# Patient Record
Sex: MALE | HISPANIC OR LATINO | Employment: UNEMPLOYED | URBAN - METROPOLITAN AREA
[De-identification: names, ages, dates, MRNs, and addresses within clinical notes are randomized per-mention and may not be internally consistent; named-entity substitution may affect disease eponyms.]

---

## 2017-01-23 ENCOUNTER — RECORDS - HEALTHEAST (OUTPATIENT)
Dept: LAB | Facility: CLINIC | Age: 67
End: 2017-01-23

## 2017-01-24 LAB — HBA1C MFR BLD: 7.4 % (ref 4.2–6.1)

## 2022-08-29 ENCOUNTER — HOSPITAL ENCOUNTER (EMERGENCY)
Facility: CLINIC | Age: 72
Discharge: HOME | End: 2022-08-29
Attending: EMERGENCY MEDICINE | Admitting: EMERGENCY MEDICINE
Payer: COMMERCIAL

## 2022-08-29 ENCOUNTER — APPOINTMENT (OUTPATIENT)
Dept: GENERAL RADIOLOGY | Facility: CLINIC | Age: 72
End: 2022-08-29
Attending: EMERGENCY MEDICINE
Payer: COMMERCIAL

## 2022-08-29 VITALS
TEMPERATURE: 99.7 F | WEIGHT: 162.7 LBS | HEART RATE: 85 BPM | DIASTOLIC BLOOD PRESSURE: 66 MMHG | OXYGEN SATURATION: 98 % | RESPIRATION RATE: 18 BRPM | SYSTOLIC BLOOD PRESSURE: 150 MMHG

## 2022-08-29 DIAGNOSIS — M25.521 RIGHT ELBOW PAIN: ICD-10-CM

## 2022-08-29 PROCEDURE — 99283 EMERGENCY DEPT VISIT LOW MDM: CPT | Performed by: EMERGENCY MEDICINE

## 2022-08-29 PROCEDURE — 99282 EMERGENCY DEPT VISIT SF MDM: CPT | Performed by: EMERGENCY MEDICINE

## 2022-08-29 PROCEDURE — 73070 X-RAY EXAM OF ELBOW: CPT | Mod: RT

## 2022-08-29 ASSESSMENT — ENCOUNTER SYMPTOMS: ARTHRALGIAS: 1

## 2022-08-29 ASSESSMENT — ACTIVITIES OF DAILY LIVING (ADL): ADLS_ACUITY_SCORE: 36

## 2022-08-30 NOTE — ED PROVIDER NOTES
[facility] EMERGENCY DEPARTMENT ([facility])       8/29/22  History     Chief Complaint   Patient presents with     Elbow Injury     Patient reported he fell on tub while washing his right leg 2-3 days ago. Patient said he fell on his right side. Denies hitting his head. Patient c/o increased pain to right elbow.      The history is provided by the patient and medical records. A  was used (son).     Viral Grove is a 71 year old male who presents to the Emergency Department for evaluation of elbow injury.  Patient reports falling in the bathtub and hitting his right elbow a few days ago.  Since then he has had persistent pain.  He has taken 2 Tylenol with some improvement.  He is able to move his elbow.    Past Medical History  Past Medical History:   Diagnosis Date     Diabetes (H)      History reviewed. No pertinent surgical history.  No current outpatient medications on file.    No Known Allergies  Family History  History reviewed. No pertinent family history.  Social History   Social History     Tobacco Use     Smoking status: Never Smoker     Smokeless tobacco: Never Used      Past medical history, past surgical history, medications, allergies, family history, and social history were reviewed with the patient. No additional pertinent items.     I have reviewed the Medications, Allergies, Past Medical and Surgical History, and Social History in the Epic system.    Review of Systems   Musculoskeletal: Positive for arthralgias (right elbow).   All other systems reviewed and are negative.    A complete review of systems was performed with pertinent positives and negatives noted in the HPI, and all other systems negative.    Physical Exam   BP: (!) 150/66  Pulse: 85  Temp: 99.7  F (37.6  C)  Resp: 18  Weight: 73.8 kg (162 lb 11.2 oz)  SpO2: 98 %      Physical Exam  Vitals and nursing note reviewed.   Constitutional:       General: He is not in acute distress.     Appearance: He  is well-developed.   HENT:      Head: Normocephalic.      Right Ear: External ear normal.      Left Ear: External ear normal.      Nose: Nose normal.   Eyes:      Extraocular Movements: Extraocular movements intact.      Conjunctiva/sclera: Conjunctivae normal.   Pulmonary:      Effort: Pulmonary effort is normal. No respiratory distress.   Abdominal:      General: Abdomen is flat. There is no distension.   Musculoskeletal:         General: Tenderness present. No deformity. Normal range of motion.      Cervical back: Normal range of motion and neck supple.      Comments: Right elbow olecranon tenderness, no radial head tenderness.  Full range of motion present   Skin:     General: Skin is warm and dry.   Neurological:      Mental Status: He is alert. Mental status is at baseline.      Comments: Oriented   Psychiatric:         Mood and Affect: Mood normal.         Behavior: Behavior normal.         ED Course     At 9:01 PM the patient was seen and examined by Rito Cornell DO in Room ED20.        Procedures       Results for orders placed or performed during the hospital encounter of 08/29/22   XR Elbow Right 2 Views     Status: None    Narrative    EXAM: XR ELBOW RIGHT 2 VIEWS  LOCATION: [facility]  DATE/TIME: 8/29/2022 8:48 PM    INDICATION: Fall with right elbow pain.  COMPARISON: None.      Impression    IMPRESSION: No fracture or dislocation. Minimal degenerative changes right radiocapitellar articulation. Marked degenerative changes right ulnohumeral articulation. Moderate right elbow effusion. Atherosclerotic vascular calcification.              Results for orders placed or performed during the hospital encounter of 08/29/22 (from the past 24 hour(s))   XR Elbow Right 2 Views    Narrative    EXAM: XR ELBOW RIGHT 2 VIEWS  LOCATION: [facility]  DATE/TIME: 8/29/2022 8:48 PM    INDICATION: Fall with right elbow  pain.  COMPARISON: None.      Impression    IMPRESSION: No fracture or dislocation. Minimal degenerative changes right radiocapitellar articulation. Marked degenerative changes right ulnohumeral articulation. Moderate right elbow effusion. Atherosclerotic vascular calcification.     Medications - No data to display          Assessments & Plan (with Medical Decision Making)   31-year-old male presents to us with a chief complaint of elbow pain after a fall.  He denies other injury.  Vital signs today were unremarkable.  Previous records were reviewed.  Images were interpreted and show no evidence of acute fracture.  Recommend over-the-counter pain relievers and follow-up with primary care.    I have reviewed the nursing notes.    I have reviewed the findings, diagnosis, plan and need for follow up with the patient.    There are no discharge medications for this patient.      Final diagnoses:   Right elbow pain       IClark am serving as a trained medical scribe to document services personally performed by Rito Cornell DO, based on the provider's statements to me.      IRito DO, was physically present and have reviewed and verified the accuracy of this note documented by Clark Quiroga.     Rito Cornell DO  8/29/2022   [facility] EMERGENCY DEPARTMENT     Rito Cornell DO  08/30/22 2408

## 2022-08-30 NOTE — ED TRIAGE NOTES
Triage Assessment     Row Name 08/29/22 2017       Triage Assessment (Adult)    Airway WDL WDL       Respiratory WDL    Respiratory WDL WDL       Skin Circulation/Temperature WDL    Skin Circulation/Temperature WDL WDL       Cardiac WDL    Cardiac WDL WDL       Peripheral/Neurovascular WDL    Peripheral Neurovascular WDL WDL  pain to right elbow but sensation is normal       Cognitive/Neuro/Behavioral WDL    Cognitive/Neuro/Behavioral WDL WDL

## 2022-08-30 NOTE — DISCHARGE INSTRUCTIONS
There is no fracture in your elbow.  I recommend over-the-counter ibuprofen as needed for pain.  Follow-up with your primary care doctor and return to us as needed

## 2022-09-11 ENCOUNTER — HOSPITAL ENCOUNTER (EMERGENCY)
Facility: CLINIC | Age: 72
Discharge: HOME | End: 2022-09-11
Attending: FAMILY MEDICINE | Admitting: FAMILY MEDICINE
Payer: COMMERCIAL

## 2022-09-11 ENCOUNTER — APPOINTMENT (OUTPATIENT)
Dept: CT IMAGING | Facility: CLINIC | Age: 72
End: 2022-09-11
Attending: FAMILY MEDICINE
Payer: COMMERCIAL

## 2022-09-11 VITALS
HEART RATE: 66 BPM | SYSTOLIC BLOOD PRESSURE: 142 MMHG | TEMPERATURE: 97.8 F | DIASTOLIC BLOOD PRESSURE: 69 MMHG | RESPIRATION RATE: 16 BRPM | OXYGEN SATURATION: 98 %

## 2022-09-11 DIAGNOSIS — M25.78 DEGENERATION OF INTERVERTEBRAL DISC OF LUMBAR REGION WITH OSTEOPHYTE OF LUMBAR VERTEBRA: ICD-10-CM

## 2022-09-11 DIAGNOSIS — Z91.81 PERSONAL HISTORY OF FALL: ICD-10-CM

## 2022-09-11 DIAGNOSIS — M51.369 DEGENERATION OF INTERVERTEBRAL DISC OF LUMBAR REGION WITH OSTEOPHYTE OF LUMBAR VERTEBRA: ICD-10-CM

## 2022-09-11 DIAGNOSIS — S32.018D OTHER CLOSED FRACTURE OF FIRST LUMBAR VERTEBRA WITH ROUTINE HEALING, SUBSEQUENT ENCOUNTER: ICD-10-CM

## 2022-09-11 PROCEDURE — 99285 EMERGENCY DEPT VISIT HI MDM: CPT | Performed by: FAMILY MEDICINE

## 2022-09-11 PROCEDURE — 72192 CT PELVIS W/O DYE: CPT

## 2022-09-11 PROCEDURE — 72131 CT LUMBAR SPINE W/O DYE: CPT

## 2022-09-11 PROCEDURE — 99285 EMERGENCY DEPT VISIT HI MDM: CPT | Mod: 25 | Performed by: FAMILY MEDICINE

## 2022-09-11 PROCEDURE — 73700 CT LOWER EXTREMITY W/O DYE: CPT | Mod: RT

## 2022-09-11 PROCEDURE — 250N000013 HC RX MED GY IP 250 OP 250 PS 637: Performed by: FAMILY MEDICINE

## 2022-09-11 RX ORDER — TRAMADOL HYDROCHLORIDE 50 MG/1
50 TABLET ORAL ONCE
Status: COMPLETED | OUTPATIENT
Start: 2022-09-11 | End: 2022-09-11

## 2022-09-11 RX ORDER — LEVOTHYROXINE SODIUM 150 UG/1
TABLET ORAL
COMMUNITY
Start: 2021-12-07

## 2022-09-11 RX ORDER — IBUPROFEN 200 MG
200 TABLET ORAL EVERY 4 HOURS PRN
COMMUNITY
End: 2023-08-04

## 2022-09-11 RX ORDER — HYDROCODONE BITARTRATE AND ACETAMINOPHEN 5; 325 MG/1; MG/1
1 TABLET ORAL EVERY 6 HOURS PRN
Qty: 12 TABLET | Refills: 0 | Status: SHIPPED | OUTPATIENT
Start: 2022-09-11 | End: 2022-09-14

## 2022-09-11 RX ORDER — IBUPROFEN 200 MG
400 TABLET ORAL ONCE
Status: COMPLETED | OUTPATIENT
Start: 2022-09-11 | End: 2022-09-11

## 2022-09-11 RX ORDER — LIDOCAINE 4 G/G
4 PATCH TOPICAL ONCE
Status: DISCONTINUED | OUTPATIENT
Start: 2022-09-11 | End: 2022-09-11 | Stop reason: HOSPADM

## 2022-09-11 RX ORDER — METFORMIN HCL 500 MG
1000 TABLET, EXTENDED RELEASE 24 HR ORAL
COMMUNITY
Start: 2021-12-07

## 2022-09-11 RX ORDER — SITAGLIPTIN 100 MG/1
TABLET, FILM COATED ORAL
COMMUNITY
Start: 2022-08-09

## 2022-09-11 RX ORDER — LISINOPRIL 10 MG/1
10 TABLET ORAL DAILY
COMMUNITY
Start: 2021-12-07

## 2022-09-11 RX ORDER — CARVEDILOL 6.25 MG/1
TABLET ORAL
COMMUNITY
Start: 2022-08-09

## 2022-09-11 RX ORDER — HYDROCODONE BITARTRATE AND ACETAMINOPHEN 5; 325 MG/1; MG/1
1 TABLET ORAL ONCE
Status: COMPLETED | OUTPATIENT
Start: 2022-09-11 | End: 2022-09-11

## 2022-09-11 RX ORDER — DOCUSATE SODIUM 100 MG/1
100 CAPSULE, LIQUID FILLED ORAL 2 TIMES DAILY
Qty: 30 CAPSULE | Refills: 12 | Status: SHIPPED | OUTPATIENT
Start: 2022-09-11

## 2022-09-11 RX ORDER — ATORVASTATIN CALCIUM 80 MG/1
TABLET, FILM COATED ORAL
COMMUNITY
Start: 2021-12-07

## 2022-09-11 RX ADMIN — IBUPROFEN 400 MG: 200 TABLET, FILM COATED ORAL at 14:35

## 2022-09-11 RX ADMIN — TRAMADOL HYDROCHLORIDE 50 MG: 50 TABLET, COATED ORAL at 12:00

## 2022-09-11 RX ADMIN — HYDROCODONE BITARTRATE AND ACETAMINOPHEN 1 TABLET: 5; 325 TABLET ORAL at 14:35

## 2022-09-11 RX ADMIN — LIDOCAINE PATCH 4% 1 PATCH: 40 PATCH TOPICAL at 14:35

## 2022-09-11 ASSESSMENT — ACTIVITIES OF DAILY LIVING (ADL)
ADLS_ACUITY_SCORE: 36
ADLS_ACUITY_SCORE: 36
ADLS_ACUITY_SCORE: 35

## 2022-09-11 ASSESSMENT — ENCOUNTER SYMPTOMS
DYSPHORIC MOOD: 1
NECK STIFFNESS: 0
COUGH: 0
ACTIVITY CHANGE: 1
BRUISES/BLEEDS EASILY: 0
APPETITE CHANGE: 0
FEVER: 0
BLOOD IN STOOL: 0
JOINT SWELLING: 0
WEAKNESS: 0
EYE REDNESS: 0
SHORTNESS OF BREATH: 0
FACIAL SWELLING: 0
ARTHRALGIAS: 1
ABDOMINAL PAIN: 0
TROUBLE SWALLOWING: 0
FLANK PAIN: 0
COLOR CHANGE: 0
RHINORRHEA: 0
DIFFICULTY URINATING: 0
FREQUENCY: 0
NECK PAIN: 0
WOUND: 0
CONFUSION: 0
HEADACHES: 0
NAUSEA: 0
VOMITING: 0
DECREASED CONCENTRATION: 1
MYALGIAS: 0
BACK PAIN: 1

## 2022-09-11 NOTE — DISCHARGE INSTRUCTIONS
Home.  Your CT shows a small fracture of the first Lumbar Spine vertebra.  Discussed with trauma and feel pain control is the main treatment.  Take ibuprofen 400mg every 6 hours.  Take tylenol for pain also.  Take the Norco for breakthrough pain.  Ice and heat to back.  Leave the lidoderm patch on for 12 hours, then remove for 12 hours.  Then reapply in the same manner for pain control with the next 3 patches over 3 more days.  See MD for follow up this week.  Return if worse symptoms or bowel or bladder concerns or extreme weakness noted.  Use the cane for ambulation as needed.      Please make an appointment to follow up with Your Primary Care Provider, [facility] (phone: [phone]), Primary Care - [facility] (phone: [phone]), and Primary Care - [facility] (phone: [phone]) as soon as possible. For a recheck this week.    Results for orders placed or performed during the hospital encounter of 09/11/22   Lumbar spine CT w/o contrast     Status: None    Narrative    EXAM: CT LUMBAR SPINE W/O CONTRAST  LOCATION: [facility]  DATE/TIME: 9/11/2022 1:41 PM    INDICATION: trauma eval with low back pain  COMPARISON: None.  TECHNIQUE: Routine CT Lumbar Spine without IV contrast. Multiplanar reformats. Dose reduction techniques were used.     FINDINGS:  VERTEBRA: Normal vertebral body heights. There is discontinuity involving the right lateral inferior endplate osteophyte of L1 (image 28, series 5).     CANAL/FORAMINA: Severe spinal canal stenosis at L3-L4.    PARASPINAL: No extraspinal abnormality.      Impression    IMPRESSION:  1.  There is an age-indeterminate (but favored to be acute) fracture involving the right lateral inferior endplate osteophyte of L1.    2.  Severe spinal canal stenosis at L3-L4.   CT Pelvis Bone wo Contrast     Status: None    Narrative    EXAM: CT PELVIS BONE WITHOUT CONTRAST, CT HIP RIGHT WITHOUT  CONTRAST  LOCATION: [facility]  DATE/TIME: 09/11/2022, 1:42 PM    INDICATION: Trauma and pain.  COMPARISON: None available.  TECHNIQUE: CT scan of the pelvis was performed without IV contrast. Multiplanar reformats were obtained. Dose reduction techniques were used.  CONTRAST: None.    FINDINGS: There is no CT evidence of an acute right hip or pelvic fracture. Mild degenerative changes of the right hip. No dislocation. Postoperative changes prior total left hip replacement. Bone formation along the periacetabular region and about the   hip is chronic.    Advanced arthritic changes in the visualized lumbar spine, including advanced spinal canal stenosis at L3-L4 Arthritic changes SI joints with partial bony fusion anteriorly, right greater than left.    Bladder is distended. Atherosclerotic vascular calcifications. No soft tissue hematoma about the right hip.      Impression     IMPRESSION:  1.  No CT evidence of an acute right hip or pelvic fracture.       CT Hip Right w/o Contrast     Status: None    Narrative    EXAM: CT PELVIS BONE WITHOUT CONTRAST, CT HIP RIGHT WITHOUT CONTRAST  LOCATION: [facility]  DATE/TIME: 09/11/2022, 1:42 PM    INDICATION: Trauma and pain.  COMPARISON: None available.  TECHNIQUE: CT scan of the pelvis was performed without IV contrast. Multiplanar reformats were obtained. Dose reduction techniques were used.  CONTRAST: None.    FINDINGS: There is no CT evidence of an acute right hip or pelvic fracture. Mild degenerative changes of the right hip. No dislocation. Postoperative changes prior total left hip replacement. Bone formation along the periacetabular region and about the   hip is chronic.    Advanced arthritic changes in the visualized lumbar spine, including advanced spinal canal stenosis at L3-L4 Arthritic changes SI joints with partial bony fusion anteriorly, right greater than  left.    Bladder is distended. Atherosclerotic vascular calcifications. No soft tissue hematoma about the right hip.      Impression     IMPRESSION:  1.  No CT evidence of an acute right hip or pelvic fracture.

## 2022-09-11 NOTE — ED PROVIDER NOTES
[facility] DEPARTMENT ([facility])     September 11, 2022      History     Chief Complaint   Patient presents with     Back Pain     HPI  Viral Groev is a 72 year old male with a past medical history significant for CAD, type 2 diabetes mellitus, who presents to the Emergency Department for evaluation of right back pain and hip. An Ipad  was used. Patient reports falling in the bathtub on 8/25. Patient states they went to [facility] after the fall and was seen for arm pain and back pain. Patient states a xray was done and was prescribed ibuprofen and tylenol for pain but no improvement. Patient states arm pain has improved but the back pain has not improved. Patient reports that pain is mostly on the right side of the body, on right hip and right lower back. Patient states that the pain has been constant and it's affecting his walk and posture. Patients states he took ibuprofen 200 mg this morning.  Patient denies any leg weakness or numbness, difficulty urinating, changes in Bowel movement and vision.      Past Medical History  Past Medical History:   Diagnosis Date     Diabetes (H)      History reviewed. No pertinent surgical history.  atorvastatin (LIPITOR) 80 MG tablet  carvedilol (COREG) 6.25 MG tablet  docusate sodium (COLACE) 100 MG capsule  HYDROcodone-acetaminophen (NORCO) 5-325 MG tablet  ibuprofen (ADVIL/MOTRIN) 200 MG tablet  JANUVIA 100 MG tablet  levothyroxine (SYNTHROID/LEVOTHROID) 150 MCG tablet  lisinopril (ZESTRIL) 10 MG tablet  metFORMIN (GLUCOPHAGE XR) 500 MG 24 hr tablet      No Known Allergies  Family History  No family history on file.  Social History   Social History     Tobacco Use     Smoking status: Never Smoker     Smokeless tobacco: Never Used      Past medical history, past surgical history, medications, allergies, family history, and social history were reviewed with the patient. No additional pertinent items.       Review of Systems   Constitutional:  Positive for activity change. Negative for appetite change and fever.   HENT: Negative for congestion, facial swelling, mouth sores, rhinorrhea and trouble swallowing.    Eyes: Negative for redness and visual disturbance.   Respiratory: Negative for cough and shortness of breath.    Cardiovascular: Negative for chest pain.   Gastrointestinal: Negative for abdominal pain, blood in stool, nausea and vomiting.   Genitourinary: Negative for difficulty urinating, flank pain and frequency.        No bowel or bladder   Musculoskeletal: Positive for arthralgias, back pain and gait problem. Negative for joint swelling, myalgias, neck pain and neck stiffness.   Skin: Negative for color change, rash and wound.   Allergic/Immunologic: Negative for immunocompromised state.   Neurological: Negative for weakness and headaches.   Hematological: Does not bruise/bleed easily.   Psychiatric/Behavioral: Positive for decreased concentration and dysphoric mood. Negative for confusion.   All other systems reviewed and are negative.    A complete review of systems was performed with pertinent positives and negatives noted in the HPI, and all other systems negative.    Physical Exam   BP: (!) 160/62  Pulse: 74  Temp: 97.8  F (36.6  C)  Resp: 16  SpO2: 99 %  Physical Exam  Vitals and nursing note reviewed.   Constitutional:       General: He is in acute distress.      Appearance: Normal appearance. He is well-developed. He is not diaphoretic.      Comments: Patient uncomfortable here  services used daughter present also   HENT:      Head: Normocephalic and atraumatic.      Nose: Nose normal.      Mouth/Throat:      Mouth: Mucous membranes are moist.   Eyes:      General: No scleral icterus.     Extraocular Movements: Extraocular movements intact.      Conjunctiva/sclera: Conjunctivae normal.      Pupils: Pupils are equal, round, and reactive to light.   Cardiovascular:      Rate and Rhythm: Normal rate and regular rhythm.    Pulmonary:      Effort: No respiratory distress.      Breath sounds: No stridor.   Abdominal:      General: There is no distension.      Palpations: Abdomen is soft. There is no mass.      Tenderness: There is no abdominal tenderness.   Musculoskeletal:         General: Tenderness present. No swelling or deformity.      Cervical back: Normal range of motion and neck supple. No rigidity or tenderness.      Comments: Tenderness noted at the right SI joint and paralumbar region.   Skin:     General: Skin is warm and dry.      Capillary Refill: Capillary refill takes less than 2 seconds.      Findings: No rash.   Neurological:      General: No focal deficit present.      Mental Status: He is alert and oriented to person, place, and time. Mental status is at baseline.   Psychiatric:      Comments: Uncomfortable because of pain otherwise appropriate         ED Course      Procedures        11:02 AM  The patient was seen and examined by Bari Verdugo MD in Room ED07.     The ER reviewing patient's previous hospitalization evaluations in the ER both here and at [facility] only x-rays were done.    We did order CT scan of the lumbar spine along with pelvis and right hip.    Patient has received 50 mg of Ultram without any relief.  Patient then received a Lidoderm patch along with 400 mg of ibuprofen and 1 Norco.    Patient had improvement of his symptoms feeling much better.    Patient CT scan of the lumbar spine showed L1 right inferior endplate osteophyte fracture    Discussed with trauma at this point patient feeling better there is no operative procedure feel at this point no indication for bracing his pain control patient feels much better feels he does not want a stay in the hospital he is comfortable going home.  Discussed with his daughter also they agree patient does seem much improved.  Was sent home with 3 more Lidoderm patches on 12 hours off 12 hours also a prescription for Norco continue ibuprofen 400 mg  Tylenol ice heat rest follow-up with MD for recheck and return if bowel bladder problems weakness etc.    At this point patient feels much better and is comfortable going home we also sent him home with a pistol- cane for support with ambulation.                  Results for orders placed or performed during the hospital encounter of 09/11/22   Lumbar spine CT w/o contrast     Status: None    Narrative    EXAM: CT LUMBAR SPINE W/O CONTRAST  LOCATION: [facility]  DATE/TIME: 9/11/2022 1:41 PM    INDICATION: trauma eval with low back pain  COMPARISON: None.  TECHNIQUE: Routine CT Lumbar Spine without IV contrast. Multiplanar reformats. Dose reduction techniques were used.     FINDINGS:  VERTEBRA: Normal vertebral body heights. There is discontinuity involving the right lateral inferior endplate osteophyte of L1 (image 28, series 5).     CANAL/FORAMINA: Severe spinal canal stenosis at L3-L4.    PARASPINAL: No extraspinal abnormality.      Impression    IMPRESSION:  1.  There is an age-indeterminate (but favored to be acute) fracture involving the right lateral inferior endplate osteophyte of L1.    2.  Severe spinal canal stenosis at L3-L4.   CT Pelvis Bone wo Contrast     Status: None    Narrative    EXAM: CT PELVIS BONE WITHOUT CONTRAST, CT HIP RIGHT WITHOUT CONTRAST  LOCATION: [facility]  DATE/TIME: 09/11/2022, 1:42 PM    INDICATION: Trauma and pain.  COMPARISON: None available.  TECHNIQUE: CT scan of the pelvis was performed without IV contrast. Multiplanar reformats were obtained. Dose reduction techniques were used.  CONTRAST: None.    FINDINGS: There is no CT evidence of an acute right hip or pelvic fracture. Mild degenerative changes of the right hip. No dislocation. Postoperative changes prior total left hip replacement. Bone formation along the periacetabular region and about the   hip is chronic.    Advanced  arthritic changes in the visualized lumbar spine, including advanced spinal canal stenosis at L3-L4 Arthritic changes SI joints with partial bony fusion anteriorly, right greater than left.    Bladder is distended. Atherosclerotic vascular calcifications. No soft tissue hematoma about the right hip.      Impression     IMPRESSION:  1.  No CT evidence of an acute right hip or pelvic fracture.       CT Hip Right w/o Contrast     Status: None    Narrative    EXAM: CT PELVIS BONE WITHOUT CONTRAST, CT HIP RIGHT WITHOUT CONTRAST  LOCATION: [facility]  DATE/TIME: 09/11/2022, 1:42 PM    INDICATION: Trauma and pain.  COMPARISON: None available.  TECHNIQUE: CT scan of the pelvis was performed without IV contrast. Multiplanar reformats were obtained. Dose reduction techniques were used.  CONTRAST: None.    FINDINGS: There is no CT evidence of an acute right hip or pelvic fracture. Mild degenerative changes of the right hip. No dislocation. Postoperative changes prior total left hip replacement. Bone formation along the periacetabular region and about the   hip is chronic.    Advanced arthritic changes in the visualized lumbar spine, including advanced spinal canal stenosis at L3-L4 Arthritic changes SI joints with partial bony fusion anteriorly, right greater than left.    Bladder is distended. Atherosclerotic vascular calcifications. No soft tissue hematoma about the right hip.      Impression     IMPRESSION:  1.  No CT evidence of an acute right hip or pelvic fracture.         Medications   traMADol (ULTRAM) tablet 50 mg (50 mg Oral Given 9/11/22 1200)   HYDROcodone-acetaminophen (NORCO) 5-325 MG per tablet 1 tablet (1 tablet Oral Given 9/11/22 1435)   ibuprofen (ADVIL/MOTRIN) tablet 400 mg (400 mg Oral Given 9/11/22 1435)        Assessments & Plan (with Medical Decision Making)  72-year-old male who fell a couple weeks ago in the bathtub initial injury elbow also low back had  plain x-rays done 2 ER visits treat with Tylenol ibuprofen having increasing pain but no bowel or bladder problems or weakness.  Tenderness in the right SI region along with paralumbar area.  Distally neurologically intact otherwise but lumbar range of motion primarily because of pain in the back area.  Pulses are good otherwise.  CT scan was done of the lumbar pelvic and right hip area pelvis and right hip were negative patient has an osteophyte fracture of the right inferior endplate of L1.  Discussed with trauma at this point no bracing or intervention recommendations reviewed for pain control patient improved initially received Ultram did not help with then given ibuprofen and 1 Norco and Lidoderm patch patient feeling much better is comfortable going home offered admission but at this point he feels he can manage at home daughter agrees patient sent out with a cane for support along with this we will be recommended to use ibuprofen and Tylenol Norco if needed for breakthrough pain ice heat rest and follow-up with MD return if any concerns at all.  Patient given 3 more days of Lidoderm patches on 12 hours off 12 hours.  Discussed with daughter regarding this agrees and understands.  Patient return of bowel bladder problems or any weakness otherwise.  Daughter agrees.       I have reviewed the nursing notes. I have reviewed the findings, diagnosis, plan and need for follow up with the patient.    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      Bari BECKFORD MD, was physically present and have reviewed and verified the accuracy of this note documented by Tiffany Kaur.     --  Bari Verdugo MD  [facility] EMERGENCY DEPARTMENT  9/11/2022    This note was created at least in part by the use of dragon voice dictation system. Inadvertent typographical errors may still exist.  Bari Verdugo MD.    Patient evaluated in the emergency department during the COVID-19 pandemic period. Careful attention to patients safety was addressed throughout the evaluation. Evaluation and treatment management was initiated with disposition made efficiently and appropriate as possible to minimize any risk of potential exposure to patient during this evaluation.       Bari Verdugo MD  09/11/22 [id number]

## 2022-09-11 NOTE — ED NOTES
Pt to discharge home. Cane provided. Discussed home rx and AVS. Answered all questions at this time.

## 2022-09-11 NOTE — ED TRIAGE NOTES
Pt reports fall in August in the tub and was seen for arm pain and back pain. The arm pain has improved but the back pain has not. It does get better with ibuprofen. Pt took ibuprofen this morning. Pt reports no pain down the leg and no urination symptoms. Pt does have metal in left leg.      Triage Assessment     Row Name 09/11/22 1039       Triage Assessment (Adult)    Airway WDL WDL       Respiratory WDL    Respiratory WDL WDL       Skin Circulation/Temperature WDL    Skin Circulation/Temperature WDL WDL       Cardiac WDL    Cardiac WDL WDL       Peripheral/Neurovascular WDL    Peripheral Neurovascular WDL WDL       Cognitive/Neuro/Behavioral WDL    Cognitive/Neuro/Behavioral WDL WDL

## 2023-08-03 ENCOUNTER — APPOINTMENT (OUTPATIENT)
Dept: GENERAL RADIOLOGY | Facility: CLINIC | Age: 73
End: 2023-08-03
Attending: EMERGENCY MEDICINE
Payer: COMMERCIAL

## 2023-08-03 ENCOUNTER — HOSPITAL ENCOUNTER (EMERGENCY)
Facility: CLINIC | Age: 73
Discharge: HOME | End: 2023-08-04
Attending: EMERGENCY MEDICINE | Admitting: EMERGENCY MEDICINE
Payer: COMMERCIAL

## 2023-08-03 ENCOUNTER — APPOINTMENT (OUTPATIENT)
Dept: CT IMAGING | Facility: CLINIC | Age: 73
End: 2023-08-03
Attending: EMERGENCY MEDICINE
Payer: COMMERCIAL

## 2023-08-03 DIAGNOSIS — M54.2 ACUTE NECK PAIN: ICD-10-CM

## 2023-08-03 DIAGNOSIS — M47.819 SPINAL ARTHRITIS DEFORMANS: ICD-10-CM

## 2023-08-03 DIAGNOSIS — M25.552 CHRONIC LEFT HIP PAIN: ICD-10-CM

## 2023-08-03 DIAGNOSIS — M48.061 SPINAL STENOSIS, LUMBAR REGION, WITHOUT NEUROGENIC CLAUDICATION: Primary | ICD-10-CM

## 2023-08-03 DIAGNOSIS — M43.6 TORTICOLLIS: ICD-10-CM

## 2023-08-03 DIAGNOSIS — M51.369 DEGENERATION OF LUMBAR INTERVERTEBRAL DISC: ICD-10-CM

## 2023-08-03 DIAGNOSIS — G89.29 CHRONIC LEFT HIP PAIN: ICD-10-CM

## 2023-08-03 PROBLEM — L30.8 SPONGIOTIC DERMATITIS: Status: ACTIVE | Noted: 2023-06-06

## 2023-08-03 PROBLEM — S72.002D: Status: ACTIVE | Noted: 2017-01-02

## 2023-08-03 PROBLEM — E55.9 VITAMIN D DEFICIENCY: Status: ACTIVE | Noted: 2017-01-05

## 2023-08-03 PROBLEM — E11.3293 NON-PROLIFERATIVE DIABETIC RETINOPATHY, BOTH EYES (H): Status: ACTIVE | Noted: 2022-09-07

## 2023-08-03 PROBLEM — Z96.642 PRESENCE OF LEFT ARTIFICIAL HIP JOINT: Status: ACTIVE | Noted: 2017-01-06

## 2023-08-03 PROBLEM — R07.9 RIGHT-SIDED CHEST PAIN: Status: ACTIVE | Noted: 2023-08-03

## 2023-08-03 PROBLEM — I48.0 PAROXYSMAL ATRIAL FIBRILLATION (H): Status: ACTIVE | Noted: 2017-01-06

## 2023-08-03 PROBLEM — Z96.642 HISTORY OF TOTAL LEFT HIP ARTHROPLASTY: Status: ACTIVE | Noted: 2017-02-15

## 2023-08-03 PROCEDURE — 99284 EMERGENCY DEPT VISIT MOD MDM: CPT | Performed by: EMERGENCY MEDICINE

## 2023-08-03 PROCEDURE — 72125 CT NECK SPINE W/O DYE: CPT

## 2023-08-03 PROCEDURE — 99284 EMERGENCY DEPT VISIT MOD MDM: CPT | Mod: 25

## 2023-08-03 PROCEDURE — 250N000013 HC RX MED GY IP 250 OP 250 PS 637: Performed by: EMERGENCY MEDICINE

## 2023-08-03 PROCEDURE — 73502 X-RAY EXAM HIP UNI 2-3 VIEWS: CPT

## 2023-08-03 RX ORDER — DIAZEPAM 5 MG
5 TABLET ORAL ONCE
Status: COMPLETED | OUTPATIENT
Start: 2023-08-03 | End: 2023-08-03

## 2023-08-03 RX ORDER — CYCLOBENZAPRINE HCL 10 MG
10 TABLET ORAL ONCE
Status: COMPLETED | OUTPATIENT
Start: 2023-08-03 | End: 2023-08-03

## 2023-08-03 RX ORDER — IBUPROFEN 600 MG/1
600 TABLET, FILM COATED ORAL ONCE
Status: COMPLETED | OUTPATIENT
Start: 2023-08-03 | End: 2023-08-03

## 2023-08-03 RX ORDER — LIDOCAINE 4 G/G
1 PATCH TOPICAL ONCE
Status: DISCONTINUED | OUTPATIENT
Start: 2023-08-03 | End: 2023-08-04 | Stop reason: HOSPADM

## 2023-08-03 RX ADMIN — DIAZEPAM 5 MG: 5 TABLET ORAL at 23:58

## 2023-08-03 RX ADMIN — IBUPROFEN 600 MG: 600 TABLET ORAL at 23:58

## 2023-08-03 RX ADMIN — CYCLOBENZAPRINE HYDROCHLORIDE 10 MG: 10 TABLET, FILM COATED ORAL at 21:42

## 2023-08-03 RX ADMIN — LIDOCAINE PATCH 4% 1 PATCH: 40 PATCH TOPICAL at 21:39

## 2023-08-03 ASSESSMENT — ACTIVITIES OF DAILY LIVING (ADL)
ADLS_ACUITY_SCORE: 37
ADLS_ACUITY_SCORE: 37

## 2023-08-04 VITALS
TEMPERATURE: 97.9 F | OXYGEN SATURATION: 100 % | HEART RATE: 72 BPM | HEIGHT: 66 IN | WEIGHT: 160.8 LBS | SYSTOLIC BLOOD PRESSURE: 117 MMHG | RESPIRATION RATE: 16 BRPM | BODY MASS INDEX: 25.84 KG/M2 | DIASTOLIC BLOOD PRESSURE: 74 MMHG

## 2023-08-04 RX ORDER — TIZANIDINE 2 MG/1
2-4 TABLET ORAL EVERY 8 HOURS PRN
Qty: 35 TABLET | Refills: 0 | Status: SHIPPED | OUTPATIENT
Start: 2023-08-04 | End: 2023-08-14

## 2023-08-04 RX ORDER — IBUPROFEN 600 MG/1
600 TABLET, FILM COATED ORAL 4 TIMES DAILY
Qty: 28 TABLET | Refills: 0 | Status: SHIPPED | OUTPATIENT
Start: 2023-08-04 | End: 2023-08-11

## 2023-08-04 NOTE — DISCHARGE INSTRUCTIONS
Please make an appointment to follow up with Your Primary Care Provider in 3-7 days if not improving.    Take 600 mg ibuprofen 4 times a day for pain and inflammation.  Take this on schedule, for approximately 1 week or until resolution of symptoms.    Take this medication with food to prevent stomach upset.  If you taking a chronic antacid medication, make sure to take this while you are taking this medication.    Rest, avoid repetitive motion, and lifting over 5 pounds with the effected extremity.  May use ice or heating pad to help with the pain.    Follow-up with her primary care doctor in 1-2 weeks if your symptoms have not resolved. You may benefit from a referral to physical therapy.     You may use  topical (skin creams/ointments/patches) medications to treat pain. These are available over the counter. Example: Icy-Hot, Bengay, Tiger Cookson, Salonpas      You may also benefit from a TENS unit, which you can buy over the counter.     Return to the emergency department if you develop worsening pain, weakness, numbness or tingling, bowel or bladder changes.

## 2023-08-04 NOTE — ED PROVIDER NOTES
"    [facility] [place] ([facility])    8/03/23      ED PROVIDER NOTE    History     Chief Complaint   Patient presents with    Neck Pain     Patient states with , for three days his neck has been hurting, he thought he had twisted it, but now both sides of neck hurt. Yesterday he was moving furniture, he states he was moving one item with his shoulder and it slipped, and that's where he think it got worse.      The history is provided by the patient, a relative and medical records. A  was used.     Viral Grove is a 72 year old male with past medical history notable for DM2 and CAD who presents to the ED for evaluation of neck pain. Patient states that he cannot turn his neck to either side due to pain. He states that he feels pain in his neck, shoulders, and in the back of his head. He describes the pain as pulsing pain. He states that the pain began three days ago but worsened this morning. He thinks the pain could have gotten worse yesterday when he was moving a large piece of furniture. He didn't hear any cracking but states that he felt something \"slip\". He denies any weakness in his arms but does report shoulder pain when he turns his head or moves heavy objects. He states that both sides of his shoulder and neck hurt when turns his head. He denies taking any pain medications. He additionally reports that he feels a headache beginning to develop at times but that it goes away quickly. Patient does state that he is retired. He is also concerned for an \"odd sensation\" in his left hip socket when he moves his leg. He states that he can walk normally but that he feels like something could be wrong with his hip socket or bone. No other symptoms noted.     CT LUMBAR SPINE W/O CONTRAST -- 9/11/2023  IMPRESSION:  1.  There is an age-indeterminate (but favored to be acute) fracture involving the right lateral inferior endplate osteophyte of L1.  2.  Severe spinal " "canal stenosis at L3-L4.    Past Medical History  Past Medical History:   Diagnosis Date    Diabetes (H)      No past surgical history on file.  ibuprofen (ADVIL/MOTRIN) 600 MG tablet  tiZANidine (ZANAFLEX) 2 MG tablet  atorvastatin (LIPITOR) 80 MG tablet  carvedilol (COREG) 6.25 MG tablet  docusate sodium (COLACE) 100 MG capsule  JANUVIA 100 MG tablet  levothyroxine (SYNTHROID/LEVOTHROID) 150 MCG tablet  lisinopril (ZESTRIL) 10 MG tablet  metFORMIN (GLUCOPHAGE XR) 500 MG 24 hr tablet      No Known Allergies  Family History  No family history on file.  Social History   Social History     Tobacco Use    Smoking status: Never    Smokeless tobacco: Never      Past medical history, past surgical history, medications, allergies, family history, and social history were reviewed with the patient. No additional pertinent items.      A medically appropriate review of systems was performed with pertinent positives and negatives noted in the HPI, and all other systems negative.    Physical Exam   BP: (!) 145/76  Pulse: 79  Temp: 99.1  F (37.3  C)  Resp: 18  Height: 168 cm (5' 6.14\")  Weight: 72.9 kg (160 lb 12.8 oz)  SpO2: 100 %  Physical Exam  Vitals and nursing note reviewed.   Constitutional:       General: He is not in acute distress.     Appearance: Normal appearance. He is well-developed. He is not ill-appearing or diaphoretic.   HENT:      Head: Normocephalic and atraumatic.      Nose: Nose normal.      Mouth/Throat:      Mouth: Mucous membranes are moist.   Eyes:      General: No scleral icterus.     Extraocular Movements: Extraocular movements intact.      Conjunctiva/sclera: Conjunctivae normal.   Neck:      Trachea: Phonation normal.     Cardiovascular:      Rate and Rhythm: Normal rate.   Pulmonary:      Effort: Pulmonary effort is normal. No respiratory distress.      Breath sounds: No stridor.   Abdominal:      General: There is no distension.   Musculoskeletal:         General: No deformity or signs of injury. "      Left shoulder: Tenderness present. No swelling, deformity, effusion, bony tenderness or crepitus. Normal range of motion. Normal strength. Normal pulse.      Cervical back: Neck supple. Torticollis and tenderness present. No edema, erythema, rigidity or crepitus. Pain with movement, spinous process tenderness and muscular tenderness present. Decreased range of motion.      Thoracic back: Normal.      Lumbar back: Normal.      Comments: Tender over sternocleidomastoid but not over the glenohumeral joint.  Pain with active range of motion of the left shoulder but no pain with passive range of motion.    5 out of 5 strength in bilateral upper extremities with symmetric handgrip, and bicep flexion and extension.   Skin:     General: Skin is warm and dry.      Coloration: Skin is not jaundiced or pale.      Findings: No bruising, erythema or rash.   Neurological:      General: No focal deficit present.      Mental Status: He is alert and oriented to person, place, and time.      Sensory: No sensory deficit.      Motor: No weakness.      Gait: Gait normal.   Psychiatric:         Mood and Affect: Mood normal.         Behavior: Behavior normal.         Thought Content: Thought content normal.           ED Course, Procedures, & Data      Procedures                 Results for orders placed or performed during the hospital encounter of 08/03/23   CT Cervical Spine w/o Contrast     Status: None    Narrative    EXAM: CT CERVICAL SPINE W/O CONTRAST  LOCATION: [facility]  DATE: 8/3/2023    INDICATION: Bilateral neck pain and difficulty rotating after lifting injury.   COMPARISON: None.  TECHNIQUE: Routine CT Cervical Spine without IV contrast. Multiplanar reformats. Dose reduction techniques were used.    FINDINGS:  VERTEBRA: Normal vertebral body heights and alignment. No acute compression fracture or posttraumatic subluxation.     CANAL/FORAMINA: Multilevel spondylosis, worst  at C5-C6 where there is moderate canal stenosis. Multilevel foraminal stenosis.    PARASPINAL: No prevertebral edema.       Impression    IMPRESSION:  1.  No acute cervical spine fracture.  2.  Advanced multilevel degenerative changes, worse at C5-C6 where there is moderate canal stenosis.   XR Pelvis and Hip Left 2 Views     Status: None    Narrative    EXAM: XR PELVIS AND HIP LEFT 2 VIEWS  LOCATION: [facility]  DATE: 8/3/2023    INDICATION: pain  COMPARISON: CT pelvis 09/01/2022      Impression    IMPRESSION: Total left hip arthroplasty. Components in satisfactory position and alignment. Prominent heterotopic bone formation lateral to the acetabulum. No evidence for fracture or dislocation. Degenerative disc disease lower lumbar spine.     Medications   cyclobenzaprine (FLEXERIL) tablet 10 mg (10 mg Oral $Given 8/3/23 2142)   ibuprofen (ADVIL/MOTRIN) tablet 600 mg (600 mg Oral $Given 8/3/23 4698)   diazepam (VALIUM) tablet 5 mg (5 mg Oral $Given 8/3/23 8766)     Labs Ordered and Resulted from Time of ED Arrival to Time of ED Departure - No data to display  XR Pelvis and Hip Left 2 Views   Final Result   IMPRESSION: Total left hip arthroplasty. Components in satisfactory position and alignment. Prominent heterotopic bone formation lateral to the acetabulum. No evidence for fracture or dislocation. Degenerative disc disease lower lumbar spine.      CT Cervical Spine w/o Contrast   Final Result   IMPRESSION:   1.  No acute cervical spine fracture.   2.  Advanced multilevel degenerative changes, worse at C5-C6 where there is moderate canal stenosis.             Critical care was not performed.     Medical Decision Making  The patient's presentation was of low complexity (an acute and uncomplicated illness or injury).    The patient's evaluation involved:  an assessment requiring an independent historian (see separate area of note for details)  review of external note(s)  from 1 sources (see separate area of note for details)  ordering and/or review of 3+ test(s) in this encounter (see separate area of note for details)  review of 3+ test result(s) ordered prior to this encounter (see separate area of note for details)  independent interpretation of testing performed by another health professional (see separate area of note for details)    The patient's management necessitated moderate risk (prescription drug management including medications given in the ED) and moderate risk (limitations due to social determinants of health (Tristanian language)).    Assessment & Plan    Viral Grove is a 72 year old male with past medical history notable for DM2 and CAD who presents to the ED for evaluation of neck pain.     Ddx: Torticollis, sternocleidomastoid strain/spasm, pathologic C-spine fracture, degenerative joint disease, cervical radiculopathy, jumped facets/anterolisthesis    Patient with reproducible tenderness over bilateral SCM muscles.  Pain with neck rotation.  Also tender over the muscular insertion on the occipital skull.  Normal strength in bilateral upper extremities.  Normal ROM at the left shoulder and no tenderness over the left glenohumeral joint.  Patient subsequently mentions chronic left hip pain.  He would like an evaluation of this as well.  He is able to fully range the left hip but has tenderness over the lateral aspect.  Given Flexeril and a lidocaine patch for the neck pain.  CT of the C-spine shows no acute fracture.  There is advanced multilevel degenerative changes worse at C5-C6 where there is moderate canal stenosis.  Since the patient does not have corresponding neurologic deficits I do not think there is an indication for additional imaging at this time.  No structural bony abnormality to cause impaired range of motion of the neck.  Since pain is very reproducible with palpation of the muscles this is very likely muscle spasm.  X-ray of the pelvis and  left hip shows normal components of the left total hip arthroplasty.  Satisfactory position and alignment.  Prominent heterotopic bone formation lateral to the acetabulum.  This was also seen on a previous CT from years ago.  No evidence of fracture or dislocation.  Advised the patient to follow-up with his hip surgeon and your primary care provider for ongoing management of chronic left hip pain.  Since pain was still at an 8 out of 10 in severity after the Flexeril and lidocaine patch.  Given 600 mg ibuprofen and 5 mg oral Valium.  On reassessment, patient states the pain is significantly improved and he is now able to range his neck better.  Discharge the patient with Zanaflex for muscle spasm and recommended he take scheduled ibuprofen for the next week.  Recommend close primary care follow-up if symptoms do not improve.  Detailed return precautions provided.      I have reviewed the nursing notes. I have reviewed the findings, diagnosis, plan and need for follow up with the patient.    Discharge Medication List as of 8/4/2023 12:50 AM        START taking these medications    Details   tiZANidine (ZANAFLEX) 2 MG tablet Take 1-2 tablets (2-4 mg) by mouth every 8 hours as needed for muscle spasms, Disp-35 tablet, R-0, E-Prescribe             Final diagnoses:   Torticollis   Acute neck pain   Chronic left hip pain     I, Gerri Mariano, am serving as a trained medical scribe to document services personally performed by Glenis Pollock MD based on the provider's statements to me on August 4, 2023.  This document has been checked and approved by the attending provider.    I, Glenis Pollock MD, was physically present and have reviewed and verified the accuracy of this note documented by Gerri Mariano medical scribe.      Glenis Pollock MD  [facility] EMERGENCY DEPARTMENT  8/3/2023     Glenis Pollock MD  08/04/23 0407

## 2023-08-04 NOTE — ED TRIAGE NOTES
Patient states with , for three days his neck has been hurting, he thought he had twisted it, but now both sides of neck hurt. Yesterday he was moving furniture, he states he was moving one item with his shoulder and it slipped, and that's where he think it got worse.      Triage Assessment       Row Name 08/03/23 2018       Triage Assessment (Adult)    Airway WDL WDL       Respiratory WDL    Respiratory WDL WDL       Skin Circulation/Temperature WDL    Skin Circulation/Temperature WDL WDL       Cardiac WDL    Cardiac WDL WDL       Peripheral/Neurovascular WDL    Peripheral Neurovascular WDL WDL       Cognitive/Neuro/Behavioral WDL    Cognitive/Neuro/Behavioral WDL WDL

## 2024-01-15 ENCOUNTER — APPOINTMENT (OUTPATIENT)
Dept: GENERAL RADIOLOGY | Facility: CLINIC | Age: 74
End: 2024-01-15
Attending: FAMILY MEDICINE
Payer: COMMERCIAL

## 2024-01-15 ENCOUNTER — HOSPITAL ENCOUNTER (EMERGENCY)
Facility: CLINIC | Age: 74
Discharge: HOME | End: 2024-01-16
Attending: FAMILY MEDICINE | Admitting: FAMILY MEDICINE
Payer: COMMERCIAL

## 2024-01-15 DIAGNOSIS — M19.022 ARTHRITIS OF LEFT UPPER ARM: Primary | ICD-10-CM

## 2024-01-15 DIAGNOSIS — M25.422 EFFUSION OF LEFT ELBOW: ICD-10-CM

## 2024-01-15 LAB
ACANTHOCYTES BLD QL SMEAR: SLIGHT
ALBUMIN SERPL BCG-MCNC: 4 G/DL (ref 3.5–5.2)
ALP SERPL-CCNC: 74 U/L (ref 40–150)
ALT SERPL W P-5'-P-CCNC: 27 U/L (ref 0–70)
ANION GAP SERPL CALCULATED.3IONS-SCNC: 8 MMOL/L (ref 7–15)
APPEARANCE FLD: ABNORMAL
AST SERPL W P-5'-P-CCNC: 31 U/L (ref 0–45)
AUER BODIES BLD QL SMEAR: ABNORMAL
BASO STIPL BLD QL SMEAR: ABNORMAL
BASOPHILS # BLD AUTO: 0 10E3/UL (ref 0–0.2)
BASOPHILS NFR BLD AUTO: 0 %
BILIRUB SERPL-MCNC: 0.7 MG/DL
BITE CELLS BLD QL SMEAR: ABNORMAL
BLISTER CELLS BLD QL SMEAR: ABNORMAL
BUN SERPL-MCNC: 23.5 MG/DL (ref 8–23)
BURR CELLS BLD QL SMEAR: ABNORMAL
CALCIUM SERPL-MCNC: 9.1 MG/DL (ref 8.8–10.2)
CELL COUNT BODY FLUID SOURCE: ABNORMAL
CHLORIDE SERPL-SCNC: 103 MMOL/L (ref 98–107)
COLOR FLD: YELLOW
CREAT SERPL-MCNC: 0.88 MG/DL (ref 0.67–1.17)
CRP SERPL-MCNC: 145.14 MG/L
DACRYOCYTES BLD QL SMEAR: ABNORMAL
DEPRECATED HCO3 PLAS-SCNC: 26 MMOL/L (ref 22–29)
EGFRCR SERPLBLD CKD-EPI 2021: 90 ML/MIN/1.73M2
ELLIPTOCYTES BLD QL SMEAR: ABNORMAL
EOSINOPHIL # BLD AUTO: 0 10E3/UL (ref 0–0.7)
EOSINOPHIL NFR BLD AUTO: 0 %
ERYTHROCYTE [DISTWIDTH] IN BLOOD BY AUTOMATED COUNT: 13.9 % (ref 10–15)
ERYTHROCYTE [SEDIMENTATION RATE] IN BLOOD BY WESTERGREN METHOD: 62 MM/HR (ref 0–20)
FRAGMENTS BLD QL SMEAR: ABNORMAL
GLUCOSE SERPL-MCNC: 182 MG/DL (ref 70–99)
HCT VFR BLD AUTO: 33.1 % (ref 40–53)
HGB BLD-MCNC: 10.9 G/DL (ref 13.3–17.7)
HGB C CRYSTALS: ABNORMAL
HOWELL-JOLLY BOD BLD QL SMEAR: ABNORMAL
IMM GRANULOCYTES # BLD: 0.1 10E3/UL
IMM GRANULOCYTES NFR BLD: 0 %
INR PPP: 0.98 (ref 0.85–1.15)
LYMPHOCYTES # BLD AUTO: 0.9 10E3/UL (ref 0.8–5.3)
LYMPHOCYTES NFR BLD AUTO: 7 %
LYMPHOCYTES NFR FLD MANUAL: 0 %
MCH RBC QN AUTO: 27.7 PG (ref 26.5–33)
MCHC RBC AUTO-ENTMCNC: 32.9 G/DL (ref 31.5–36.5)
MCV RBC AUTO: 84 FL (ref 78–100)
MONOCYTES # BLD AUTO: 1.4 10E3/UL (ref 0–1.3)
MONOCYTES NFR BLD AUTO: 11 %
MONOS+MACROS NFR FLD MANUAL: 4 %
NEUTROPHILS # BLD AUTO: 10.4 10E3/UL (ref 1.6–8.3)
NEUTROPHILS NFR BLD AUTO: 82 %
NEUTS BAND NFR FLD MANUAL: 96 %
NEUTS HYPERSEG BLD QL SMEAR: ABNORMAL
NRBC # BLD AUTO: 0 10E3/UL
NRBC BLD AUTO-RTO: 0 /100
PLAT MORPH BLD: ABNORMAL
PLATELET # BLD AUTO: ABNORMAL 10*3/UL
POLYCHROMASIA BLD QL SMEAR: ABNORMAL
POTASSIUM SERPL-SCNC: 4.3 MMOL/L (ref 3.4–5.3)
PROT SERPL-MCNC: 7.3 G/DL (ref 6.4–8.3)
RBC # BLD AUTO: 3.93 10E6/UL (ref 4.4–5.9)
RBC AGGLUT BLD QL: ABNORMAL
RBC MORPH BLD: ABNORMAL
ROULEAUX BLD QL SMEAR: ABNORMAL
SICKLE CELLS BLD QL SMEAR: ABNORMAL
SMUDGE CELLS BLD QL SMEAR: ABNORMAL
SODIUM SERPL-SCNC: 137 MMOL/L (ref 135–145)
SPHEROCYTES BLD QL SMEAR: ABNORMAL
STOMATOCYTES BLD QL SMEAR: ABNORMAL
TARGETS BLD QL SMEAR: ABNORMAL
TOXIC GRANULES BLD QL SMEAR: ABNORMAL
URATE SERPL-MCNC: 5.4 MG/DL (ref 3.4–7)
VARIANT LYMPHS BLD QL SMEAR: ABNORMAL
WBC # BLD AUTO: 12.8 10E3/UL (ref 4–11)
WBC # FLD AUTO: ABNORMAL /UL

## 2024-01-15 PROCEDURE — 87075 CULTR BACTERIA EXCEPT BLOOD: CPT

## 2024-01-15 PROCEDURE — 87205 SMEAR GRAM STAIN: CPT

## 2024-01-15 PROCEDURE — 85041 AUTOMATED RBC COUNT: CPT | Performed by: FAMILY MEDICINE

## 2024-01-15 PROCEDURE — 85610 PROTHROMBIN TIME: CPT | Performed by: FAMILY MEDICINE

## 2024-01-15 PROCEDURE — 89060 EXAM SYNOVIAL FLUID CRYSTALS: CPT

## 2024-01-15 PROCEDURE — 89050 BODY FLUID CELL COUNT: CPT

## 2024-01-15 PROCEDURE — 250N000011 HC RX IP 250 OP 636: Performed by: FAMILY MEDICINE

## 2024-01-15 PROCEDURE — 85652 RBC SED RATE AUTOMATED: CPT | Performed by: FAMILY MEDICINE

## 2024-01-15 PROCEDURE — 258N000003 HC RX IP 258 OP 636: Performed by: FAMILY MEDICINE

## 2024-01-15 PROCEDURE — 84550 ASSAY OF BLOOD/URIC ACID: CPT | Performed by: FAMILY MEDICINE

## 2024-01-15 PROCEDURE — 20605 DRAIN/INJ JOINT/BURSA W/O US: CPT

## 2024-01-15 PROCEDURE — 96361 HYDRATE IV INFUSION ADD-ON: CPT | Mod: 59

## 2024-01-15 PROCEDURE — 96374 THER/PROPH/DIAG INJ IV PUSH: CPT

## 2024-01-15 PROCEDURE — 99284 EMERGENCY DEPT VISIT MOD MDM: CPT | Mod: 25

## 2024-01-15 PROCEDURE — 80053 COMPREHEN METABOLIC PANEL: CPT | Performed by: FAMILY MEDICINE

## 2024-01-15 PROCEDURE — 36415 COLL VENOUS BLD VENIPUNCTURE: CPT | Performed by: FAMILY MEDICINE

## 2024-01-15 PROCEDURE — 73080 X-RAY EXAM OF ELBOW: CPT | Mod: LT

## 2024-01-15 PROCEDURE — 99285 EMERGENCY DEPT VISIT HI MDM: CPT | Performed by: FAMILY MEDICINE

## 2024-01-15 PROCEDURE — 86140 C-REACTIVE PROTEIN: CPT | Performed by: FAMILY MEDICINE

## 2024-01-15 RX ORDER — KETOROLAC TROMETHAMINE 15 MG/ML
15 INJECTION, SOLUTION INTRAMUSCULAR; INTRAVENOUS ONCE
Status: COMPLETED | OUTPATIENT
Start: 2024-01-15 | End: 2024-01-15

## 2024-01-15 RX ORDER — LIDOCAINE 40 MG/G
CREAM TOPICAL
Status: DISCONTINUED | OUTPATIENT
Start: 2024-01-15 | End: 2024-01-16 | Stop reason: HOSPADM

## 2024-01-15 RX ADMIN — SODIUM CHLORIDE 1000 ML: 9 INJECTION, SOLUTION INTRAVENOUS at 20:18

## 2024-01-15 RX ADMIN — KETOROLAC TROMETHAMINE 15 MG: 15 INJECTION, SOLUTION INTRAMUSCULAR; INTRAVENOUS at 23:49

## 2024-01-15 ASSESSMENT — ACTIVITIES OF DAILY LIVING (ADL)
ADLS_ACUITY_SCORE: 33
ADLS_ACUITY_SCORE: 35
ADLS_ACUITY_SCORE: 35

## 2024-01-16 ENCOUNTER — TELEPHONE (OUTPATIENT)
Dept: EMERGENCY MEDICINE | Facility: CLINIC | Age: 74
End: 2024-01-16
Payer: COMMERCIAL

## 2024-01-16 VITALS
OXYGEN SATURATION: 97 % | RESPIRATION RATE: 19 BRPM | SYSTOLIC BLOOD PRESSURE: 122 MMHG | TEMPERATURE: 98.6 F | HEART RATE: 94 BPM | DIASTOLIC BLOOD PRESSURE: 68 MMHG

## 2024-01-16 LAB — CRYSTALS SNV MICRO: ABNORMAL

## 2024-01-16 RX ORDER — NAPROXEN 250 MG/1
250 TABLET ORAL 2 TIMES DAILY WITH MEALS
Qty: 10 TABLET | Refills: 0 | Status: SHIPPED | OUTPATIENT
Start: 2024-01-16

## 2024-01-16 ASSESSMENT — ACTIVITIES OF DAILY LIVING (ADL): ADLS_ACUITY_SCORE: 35

## 2024-01-16 NOTE — PROGRESS NOTES
Brief orthopedic update:  Preliminary aspiration results are as follows  Cell count and differential 26K with 96% neutrophils, Gram stain - no growth, 3+ WBC, Aerobic/Anaerobic culture pending, Crystals pending     No concern for infection at this time. No plan for surgical intervention. OK for regular diet. Crystals pending. If positive, recommend treating for gout and considering inpatient medicine consult.       Silvano Grimes MD  PGY-2  Orthopaedic Surgery

## 2024-01-16 NOTE — DISCHARGE INSTRUCTIONS
Home.  Your xray did not show any fracture.  Your labs show some inflammation in the elbow.  You were seen by orthopedics who emily fluid out of your elbow and evaluated it and felt no sign of infection.  Take the naprosyn 250mg twice a day for 5 days to see if this helps.  Make an appointment with primary MD for a recheck this week.  Return if fever or other concerns.  OK to take tylenol also for pain.    Please make an appointment to follow up with Your Primary Care Provider and Primary Care Center (phone: [phone]) as soon as possible.    Results for orders placed or performed during the hospital encounter of 01/15/24   Elbow  XR, G/E 3 views, left     Status: None    Narrative    EXAM: XR ELBOW LEFT G/E 3 VIEWS  LOCATION: [facility]  DATE: 1/15/2024    INDICATION: pain and swelling  COMPARISON: None.      Impression    IMPRESSION: No fracture. Moderate degenerative changes in the elbow. Tiny loose body in the anterior portion of the joint. No effusion.   Erythrocyte sedimentation rate auto     Status: Abnormal   Result Value Ref Range    Erythrocyte Sedimentation Rate 62 (H) 0 - 20 mm/hr   CRP inflammation     Status: Abnormal   Result Value Ref Range    CRP Inflammation 145.14 (H) <5.00 mg/L   INR     Status: Normal   Result Value Ref Range    INR 0.98 0.85 - 1.15   Comprehensive metabolic panel     Status: Abnormal   Result Value Ref Range    Sodium 137 135 - 145 mmol/L    Potassium 4.3 3.4 - 5.3 mmol/L    Carbon Dioxide (CO2) 26 22 - 29 mmol/L    Anion Gap 8 7 - 15 mmol/L    Urea Nitrogen 23.5 (H) 8.0 - 23.0 mg/dL    Creatinine 0.88 0.67 - 1.17 mg/dL    GFR Estimate 90 >60 mL/min/1.73m2    Calcium 9.1 8.8 - 10.2 mg/dL    Chloride 103 98 - 107 mmol/L    Glucose 182 (H) 70 - 99 mg/dL    Alkaline Phosphatase 74 40 - 150 U/L    AST 31 0 - 45 U/L    ALT 27 0 - 70 U/L    Protein Total 7.3 6.4 - 8.3 g/dL    Albumin 4.0 3.5 - 5.2 g/dL    Bilirubin Total 0.7 <=1.2 mg/dL    Uric acid     Status: Normal   Result Value Ref Range    Uric Acid 5.4 3.4 - 7.0 mg/dL   CBC with platelets and differential     Status: Abnormal   Result Value Ref Range    WBC Count 12.8 (H) 4.0 - 11.0 10e3/uL    RBC Count 3.93 (L) 4.40 - 5.90 10e6/uL    Hemoglobin 10.9 (L) 13.3 - 17.7 g/dL    Hematocrit 33.1 (L) 40.0 - 53.0 %    MCV 84 78 - 100 fL    MCH 27.7 26.5 - 33.0 pg    MCHC 32.9 31.5 - 36.5 g/dL    RDW 13.9 10.0 - 15.0 %    Platelet Count      % Neutrophils 82 %    % Lymphocytes 7 %    % Monocytes 11 %    % Eosinophils 0 %    % Basophils 0 %    % Immature Granulocytes 0 %    NRBCs per 100 WBC 0 <1 /100    Absolute Neutrophils 10.4 (H) 1.6 - 8.3 10e3/uL    Absolute Lymphocytes 0.9 0.8 - 5.3 10e3/uL    Absolute Monocytes 1.4 (H) 0.0 - 1.3 10e3/uL    Absolute Eosinophils 0.0 0.0 - 0.7 10e3/uL    Absolute Basophils 0.0 0.0 - 0.2 10e3/uL    Absolute Immature Granulocytes 0.1 <=0.4 10e3/uL    Absolute NRBCs 0.0 10e3/uL   Cell Count Body Fluid     Status: Abnormal   Result Value Ref Range    Color Yellow Colorless, Yellow    Clarity Turbid (A) Clear    Cell Count Fluid Source Elbow, Left     Total Nucleated Cells 26,000 /uL    Narrative    No reference ranges have been established.  This result  should be interpreted in the context of the patient's clinical condition and   compared to simultaneous measurement in the patient's blood.         RBC and Platelet Morphology     Status: Abnormal   Result Value Ref Range    Platelet Assessment Platelets Clumped (A) Automated Count Confirmed. Platelet morphology is normal.    Acanthocytes Slight (A) None Seen    Venu Rods      Basophilic Stippling      Bite Cells      Blister Cells      [place] Cells      Elliptocytes      Hgb C Crystals      Bocanegra-Jolly Bodies      Hypersegmented Neutrophils      Polychromasia      RBC agglutination      RBC Fragments      Reactive Lymphocytes      Rouleaux      Sickle Cells      Smudge Cells      Spherocytes      Stomatocytes       Target Cells      Teardrop Cells      Toxic Neutrophils      RBC Morphology Confirmed RBC Indices    Differential Body Fluid     Status: None   Result Value Ref Range    % Neutrophils 96 %    % Lymphocytes 0 %    % Monocyte/Macrophages 4 %    Narrative    No reference ranges have been established. This result should be interpreted in the context of the patient's clinical condition and compared to simultaneous measurement in the patient's blood.   Synovial fluid Aerobic Bacterial Culture Routine With Gram Stain     Status: None (Preliminary result)    Specimen: Elbow, Left; Synovial fluid   Result Value Ref Range    Gram Stain Result No organisms seen     Gram Stain Result 3+ WBC seen    CBC with platelets differential     Status: Abnormal    Narrative    The following orders were created for panel order CBC with platelets differential.  Procedure                               Abnormality         Status                     ---------                               -----------         ------                     CBC with platelets and d...[[id number]]  Abnormal            Final result               RBC and Platelet Morphology[[id number]]  Abnormal            Final result                 Please view results for these tests on the individual orders.   Cell count with differential fluid     Status: Abnormal    Narrative    The following orders were created for panel order Cell count with differential fluid.  Procedure                               Abnormality         Status                     ---------                               -----------         ------                     Cell Count Body Fluid[[id number]]        Abnormal            Final result               Differential Body Fluid[[id number]]                          Final result                 Please view results for these tests on the individual orders.

## 2024-01-16 NOTE — ED PROVIDER NOTES
"    [facility] EMERGENCY DEPARTMENT ([facility])    1/15/24      ED PROVIDER NOTE    History     Chief Complaint   Patient presents with     Arm Injury     Patient states with , \"I injured my arm (left side).\" He states he injured it, but does not know how. He states it has been in pain for 1.5 days, describes it as \"very severe,\" and states it is constant.      The history is limited by a language barrier. A  was used.     Viral Grove is a 73 year old, right-handed male with PMH notable for DM2, HTN, CAD who presents to the Emergency Department today due to left elbow pain. Patient presents to the ED with his grandson who is also  for the interview. Patient reports 1.5 days of left elbow pain, more posterior radial side. He denies shoulder pain or neck pain. He denies fever or chill. He denies known trauma or injuries. He states that the pain is worse with movement and extension of his left forearm. Of note, he has visited [place] with family and just come back to [place] recently.  No reports of trauma otherwise noted.  No fevers or chills no previous history no shoulder pain neck pain etc.  No rash noted no drainage.  No history of bleeding disorders etc.  Now notes limited range of motion as far as rotation along with flexion extension.  No other injuries reported.     Past Medical History  Past Medical History:   Diagnosis Date     Diabetes (H)      No past surgical history on file.  naproxen (NAPROSYN) 250 MG tablet  atorvastatin (LIPITOR) 80 MG tablet  carvedilol (COREG) 6.25 MG tablet  docusate sodium (COLACE) 100 MG capsule  JANUVIA 100 MG tablet  levothyroxine (SYNTHROID/LEVOTHROID) 150 MCG tablet  lisinopril (ZESTRIL) 10 MG tablet  metFORMIN (GLUCOPHAGE XR) 500 MG 24 hr tablet      No Known Allergies  Family History  No family history on file.  Social History   Social History     Tobacco Use     Smoking status: Never     Smokeless tobacco: Never         A " medically appropriate review of systems was performed with pertinent positives and negatives noted in the HPI, and all other systems negative.    Physical Exam   BP: (!) 178/73  Pulse: 85  Temp: 98.3  F (36.8  C)  Resp: 18  SpO2: 98 %  Physical Exam  Vitals and nursing note reviewed.   Constitutional:       General: He is in acute distress.      Appearance: He is well-developed. He is not toxic-appearing or diaphoretic.      Comments: Patient here with grandsons who do interpret for him.  At this point patient vitally stable is afebrile blood pressure slightly elevated.   HENT:      Head: Normocephalic and atraumatic.      Nose: Nose normal. No congestion.      Mouth/Throat:      Mouth: Mucous membranes are moist.   Eyes:      General: No scleral icterus.     Extraocular Movements: Extraocular movements intact.      Conjunctiva/sclera: Conjunctivae normal.      Pupils: Pupils are equal, round, and reactive to light.   Cardiovascular:      Rate and Rhythm: Normal rate and regular rhythm.   Pulmonary:      Effort: Pulmonary effort is normal. No respiratory distress.      Breath sounds: No stridor.   Chest:      Chest wall: No tenderness.   Abdominal:      General: Abdomen is flat.      Tenderness: There is no guarding.   Musculoskeletal:         General: Swelling and tenderness present.      Cervical back: Normal range of motion and neck supple. No rigidity or tenderness.      Comments: Left elbow with generalized tenderness palpation of in the olecranon region along with over the lateral area.  There is just minimal erythema with slight warmth and slight swelling but very minimal at this point.  Decreased supination pronation along with extension is to approximately 130 degrees but cannot fully extend 180.  This is because of pain   Skin:     General: Skin is warm and dry.      Capillary Refill: Capillary refill takes less than 2 seconds.      Findings: Erythema present. No rash.      Comments: Patient's left elbow  with just minimal erythema on the olecranon region but no major swelling there is some slight fluctuance and also some warmth with this also tenderness over the radial head.   Neurological:      General: No focal deficit present.      Mental Status: He is alert and oriented to person, place, and time.   Psychiatric:         Mood and Affect: Mood normal.         Thought Content: Thought content normal.         Judgment: Judgment normal.           ED Course, Procedures, & Data       Records reviewed in epic.  Patient has history of bilateral osteoarthritis of the knees history of hypothyroidism history of spinal stenosis also.  Diabetes hypertension medications reviewed etc.    In the ER concern at this point was to rule out infectious etiology trauma etc.  Here in the ER IV established labs drawn reviewed.  X-rays were done of the left elbow there is some chronic osteophyte seen but no major effusion or other abnormalities no gross fractures identified.  Patient white count 12.8 hemoglobin 10.9.  Platelets noted to be clumped.  Sed rate was 62 uric acid 5.4.  CRP noted to be 145.  Sodium 137 potassium 4.3 bicarb is 26 gap is 8 BUN is 23.5 creatinine is 0.8 glucose 182 LFTs within normal limits.    Patient received a liter of fluid with his prerenal findings in the ER.    Discussed with orthopedics and consulted them they did come and see the patient.  Aspiration of the joint was done for orthopedics with fluid analysis noted 26,000 nucleated cells felt it was not septic patient organisms were negative as Gram stain culture sent.  Crystals were pending patient wanted to go home and it may be several hours before the crystal results are done patient had received after reviewing his hypertension talk to orthopedics I did give him 1 dose of Toradol 15 mg IV.  Patient felt much better he stated the pain is gone after this.  Patient is comfortable going home will have the patient follow-up with his primary physician this  week to recheck his symptoms and to follow-up on his final results of his fluid analysis including crystals.    Patient comfort this plan will be discharged with low-dose Naprosyn 250 mg twice a day for 5 days only as we wanted to minimize this with his underlying history of diabetes hypertension and minimize any cardiovascular risk with nonsteroidals.    Patient comfortable's plan was discharged to follow-up with primary physician this week to follow-up on this.            Procedures       Procedure Supervision Attestation:  I was present for the critical and key portions of elbow joint aspiration with details in the note by orthopedic resident Silvano dated 1/15/2024, and I was immediately available throughout the procedure.                   Results for orders placed or performed during the hospital encounter of 01/15/24   Elbow  XR, G/E 3 views, left     Status: None    Narrative    EXAM: XR ELBOW LEFT G/E 3 VIEWS  LOCATION: [facility]  DATE: 1/15/2024    INDICATION: pain and swelling  COMPARISON: None.      Impression    IMPRESSION: No fracture. Moderate degenerative changes in the elbow. Tiny loose body in the anterior portion of the joint. No effusion.   Erythrocyte sedimentation rate auto     Status: Abnormal   Result Value Ref Range    Erythrocyte Sedimentation Rate 62 (H) 0 - 20 mm/hr   CRP inflammation     Status: Abnormal   Result Value Ref Range    CRP Inflammation 145.14 (H) <5.00 mg/L   INR     Status: Normal   Result Value Ref Range    INR 0.98 0.85 - 1.15   Comprehensive metabolic panel     Status: Abnormal   Result Value Ref Range    Sodium 137 135 - 145 mmol/L    Potassium 4.3 3.4 - 5.3 mmol/L    Carbon Dioxide (CO2) 26 22 - 29 mmol/L    Anion Gap 8 7 - 15 mmol/L    Urea Nitrogen 23.5 (H) 8.0 - 23.0 mg/dL    Creatinine 0.88 0.67 - 1.17 mg/dL    GFR Estimate 90 >60 mL/min/1.73m2    Calcium 9.1 8.8 - 10.2 mg/dL    Chloride 103 98 - 107 mmol/L    Glucose 182  (H) 70 - 99 mg/dL    Alkaline Phosphatase 74 40 - 150 U/L    AST 31 0 - 45 U/L    ALT 27 0 - 70 U/L    Protein Total 7.3 6.4 - 8.3 g/dL    Albumin 4.0 3.5 - 5.2 g/dL    Bilirubin Total 0.7 <=1.2 mg/dL   Uric acid     Status: Normal   Result Value Ref Range    Uric Acid 5.4 3.4 - 7.0 mg/dL   CBC with platelets and differential     Status: Abnormal   Result Value Ref Range    WBC Count 12.8 (H) 4.0 - 11.0 10e3/uL    RBC Count 3.93 (L) 4.40 - 5.90 10e6/uL    Hemoglobin 10.9 (L) 13.3 - 17.7 g/dL    Hematocrit 33.1 (L) 40.0 - 53.0 %    MCV 84 78 - 100 fL    MCH 27.7 26.5 - 33.0 pg    MCHC 32.9 31.5 - 36.5 g/dL    RDW 13.9 10.0 - 15.0 %    Platelet Count      % Neutrophils 82 %    % Lymphocytes 7 %    % Monocytes 11 %    % Eosinophils 0 %    % Basophils 0 %    % Immature Granulocytes 0 %    NRBCs per 100 WBC 0 <1 /100    Absolute Neutrophils 10.4 (H) 1.6 - 8.3 10e3/uL    Absolute Lymphocytes 0.9 0.8 - 5.3 10e3/uL    Absolute Monocytes 1.4 (H) 0.0 - 1.3 10e3/uL    Absolute Eosinophils 0.0 0.0 - 0.7 10e3/uL    Absolute Basophils 0.0 0.0 - 0.2 10e3/uL    Absolute Immature Granulocytes 0.1 <=0.4 10e3/uL    Absolute NRBCs 0.0 10e3/uL   Crystal ID Synovial Fluid     Status: Abnormal   Result Value Ref Range    Crystals Analysis (A) No clinically significant crystals seen.     Positive for intracellular crystals, consistent with calcium pyrophosphate dihydrate crystals.   Cell Count Body Fluid     Status: Abnormal   Result Value Ref Range    Color Yellow Colorless, Yellow    Clarity Turbid (A) Clear    Cell Count Fluid Source Elbow, Left     Total Nucleated Cells 26,000 /uL    Narrative    No reference ranges have been established.  This result  should be interpreted in the context of the patient's clinical condition and   compared to simultaneous measurement in the patient's blood.         RBC and Platelet Morphology     Status: Abnormal   Result Value Ref Range    Platelet Assessment Platelets Clumped (A) Automated Count  Confirmed. Platelet morphology is normal.    Acanthocytes Slight (A) None Seen    Venu Rods      Basophilic Stippling      Bite Cells      Blister Cells      Yolis Cells      Elliptocytes      Hgb C Crystals      Bocanegra-Jolly Bodies      Hypersegmented Neutrophils      Polychromasia      RBC agglutination      RBC Fragments      Reactive Lymphocytes      Rouleaux      Sickle Cells      Smudge Cells      Spherocytes      Stomatocytes      Target Cells      Teardrop Cells      Toxic Neutrophils      RBC Morphology Confirmed RBC Indices    Differential Body Fluid     Status: None   Result Value Ref Range    % Neutrophils 96 %    % Lymphocytes 0 %    % Monocyte/Macrophages 4 %    Narrative    No reference ranges have been established. This result should be interpreted in the context of the patient's clinical condition and compared to simultaneous measurement in the patient's blood.   Synovial fluid Aerobic Bacterial Culture Routine With Gram Stain     Status: None (Preliminary result)    Specimen: Elbow, Left; Synovial fluid   Result Value Ref Range    Culture No growth, less than 1 day     Gram Stain Result No organisms seen     Gram Stain Result 3+ WBC seen    CBC with platelets differential     Status: Abnormal    Narrative    The following orders were created for panel order CBC with platelets differential.  Procedure                               Abnormality         Status                     ---------                               -----------         ------                     CBC with platelets and d...[[id number]]  Abnormal            Final result               RBC and Platelet Morphology[[id number]]  Abnormal            Final result                 Please view results for these tests on the individual orders.   Cell count with differential fluid     Status: Abnormal    Narrative    The following orders were created for panel order Cell count with differential fluid.  Procedure                                Abnormality         Status                     ---------                               -----------         ------                     Cell Count Body Fluid[[id number]]        Abnormal            Final result               Differential Body Fluid[[id number]]                          Final result                 Please view results for these tests on the individual orders.     Medications   sodium chloride 0.9% BOLUS 1,000 mL (0 mLs Intravenous Stopped 1/15/24 1513)   ketorolac (TORADOL) injection 15 mg (15 mg Intravenous $Given 1/15/24 2254)     Labs Ordered and Resulted from Time of ED Arrival to Time of ED Departure   ERYTHROCYTE SEDIMENTATION RATE AUTO - Abnormal       Result Value    Erythrocyte Sedimentation Rate 62 (*)    CRP INFLAMMATION - Abnormal    CRP Inflammation 145.14 (*)    COMPREHENSIVE METABOLIC PANEL - Abnormal    Sodium 137      Potassium 4.3      Carbon Dioxide (CO2) 26      Anion Gap 8      Urea Nitrogen 23.5 (*)     Creatinine 0.88      GFR Estimate 90      Calcium 9.1      Chloride 103      Glucose 182 (*)     Alkaline Phosphatase 74      AST 31      ALT 27      Protein Total 7.3      Albumin 4.0      Bilirubin Total 0.7     CBC WITH PLATELETS AND DIFFERENTIAL - Abnormal    WBC Count 12.8 (*)     RBC Count 3.93 (*)     Hemoglobin 10.9 (*)     Hematocrit 33.1 (*)     MCV 84      MCH 27.7      MCHC 32.9      RDW 13.9      Platelet Count        % Neutrophils 82      % Lymphocytes 7      % Monocytes 11      % Eosinophils 0      % Basophils 0      % Immature Granulocytes 0      NRBCs per 100 WBC 0      Absolute Neutrophils 10.4 (*)     Absolute Lymphocytes 0.9      Absolute Monocytes 1.4 (*)     Absolute Eosinophils 0.0      Absolute Basophils 0.0      Absolute Immature Granulocytes 0.1      Absolute NRBCs 0.0     CELL COUNT BODY FLUID - Abnormal    Color Yellow      Clarity Turbid (*)     Cell Count Fluid Source Elbow, Left      Total Nucleated Cells 26,000     RBC AND PLATELET MORPHOLOGY - Abnormal     Platelet Assessment Platelets Clumped (*)     Acanthocytes Slight (*)     Venu Rods        Basophilic Stippling        Bite Cells        Blister Cells        Yolis Cells        Elliptocytes        Hgb C Crystals        Bocanegra-Jolly Bodies        Hypersegmented Neutrophils        Polychromasia        RBC agglutination        RBC Fragments        Reactive Lymphocytes        Rouleaux        Sickle Cells        Smudge Cells        Spherocytes        Stomatocytes        Target Cells        Teardrop Cells        Toxic Neutrophils        RBC Morphology Confirmed RBC Indices     INR - Normal    INR 0.98     URIC ACID - Normal    Uric Acid 5.4     DIFERENTIAL BODY FLUID    % Neutrophils 96      % Lymphocytes 0      % Monocyte/Macrophages 4     ANAEROBIC BACTERIAL CULTURE ROUTINE   CELL COUNT WITH DIFFERENTIAL FLUID     Elbow  XR, G/E 3 views, left   Final Result   IMPRESSION: No fracture. Moderate degenerative changes in the elbow. Tiny loose body in the anterior portion of the joint. No effusion.             Critical care was not performed.     Medical Decision Making  The patient's presentation was of moderate complexity (an acute illness with systemic symptoms).    The patient's evaluation involved:  an assessment requiring an independent historian (see separate area of note for details)  review of external note(s) from 3+ sources (see separate area of note for details)  review of 3+ test result(s) ordered prior to this encounter (see separate area of note for details)  ordering and/or review of 3+ test(s) in this encounter (see separate area of note for details)  discussion of management or test interpretation with another health professional (see separate area of note for details)    The patient's management necessitated moderate risk (prescription drug management including medications given in the ED) and moderate risk (a decision regarding minor procedure (joint aspiration) with risk factors of  none).      Assessment & Plan   73-year-old male with history hypertension diabetes but no history of gout pseudogout but does have osteoarthritis presented the ER for evaluation of 1 and half days of nontraumatic left elbow pain patient with minimal swelling and warmth although afebrile white count was slightly elevated along with inflammatory markers.  Discussed with orthopedics who did consult joint aspiration was done with fluid analysis showing no organisms on Gram stain this cell count was not considered significant for infection.  Patient did receive 15 mg of Toradol IV x 1 dose with marked improvement of symptoms feeling much better patient wanted to go home unclear when the crystal results would be back discussed with patient's grandson here regarding follow-up with the [facility] physician this week for recheck on these and his symptoms in the meantime we sent him out with low-dose Naprosyn 250 mg twice a day for 5 days as he had a quite a dramatic response.  Patient was then discharged will return if any worsening symptoms at all.  At this point crystals analysis of the fluid are still pending I talk to orthopedics about this also it may take several hours as an patient is feeling much better wants to go home with grandsons and will follow-up.       I have reviewed the nursing notes. I have reviewed the findings, diagnosis, plan and need for follow up with the patient.    Discharge Medication List as of 1/16/2024  1:29 AM      START taking these medications    Details   naproxen (NAPROSYN) 250 MG tablet Take 1 tablet (250 mg) by mouth 2 times daily (with meals) For elbow pain and inflammation, Disp-10 tablet, R-0, Local Print             Final diagnoses:   Effusion of left elbow       Bari Verdugo MD  [facility] EMERGENCY DEPARTMENT  1/15/2024    This note was created at least in part by the use of dragon voice dictation system. Inadvertent typographical errors may still exist.  Bari Verdugo,  MD.    Patient evaluated in the emergency department during the COVID-19 pandemic period. Careful attention to patients safety was addressed throughout the evaluation. Evaluation and treatment management was initiated with disposition made efficiently and appropriate as possible to minimize any risk of potential exposure to patient during this evaluation.       Bari Verdugo MD  01/16/24 1401

## 2024-01-16 NOTE — TELEPHONE ENCOUNTER
[facility] Emergency Department/Urgent Care Lab result notification  [Note:  ED Lab Results RN will reference the [facility] Emergency Dept visit note prior to contacting patient AND/OR prior to consulting Emergency Dept Provider.  Highlights of Emergency Dept visit in information summary at the bottom of this telephone note]    1. Reason for call  Notify of lab results  Assess patient symptoms [if necessary]  Review ED Providers recommendations/discharge instructions (if necessary)  Advise per [facility] ED lab result protocol    2. Lab Result (including Rx patient on, if applicable).  If culture, copy of lab report at bottom.  Component      Latest Ref Rng 1/15/2024  8:29 PM   Crystal Analysis      No clinically significant crystals seen.  Positive for intracellular crystals, consistent with calcium pyrophosphate dihydrate crystals. !       Legend:  ! Abnormal    3. RN Assessment (Patient's current Symptoms):  Time of call: 1/16/2024 3:29 PM  Assessment: with assistance of  - Left voicemail message requesting a call back to [facility] ED Lab Result RN at [phone].  RN is available every day between 9 a.m. and 5:30 p.m.      Primary Care Clinic:  [facility] primary care - Alo Quinones MD  Fax #: [phone]     4. RN Recommendations/Instructions per [facility] ED lab result protocol  [facility] ED lab result protocol used: [facility]  RN sent letter & faxed final report to primary care clinic.        Igor Churchill RN  [facility] [place]  Emergency Dept Lab Result RN  Ph# [phone]

## 2024-01-16 NOTE — ED TRIAGE NOTES
"Patient states with , \"I injured my arm (left side).\" He states he injured it, but does not know how. He states it has been in pain for 1.5 days, describes it as \"very severe,\" and states it is constant.      Triage Assessment (Adult)       Row Name 01/15/24 1848          Triage Assessment    Airway WDL WDL        Respiratory WDL    Respiratory WDL WDL        Skin Circulation/Temperature WDL    Skin Circulation/Temperature WDL WDL        Cardiac WDL    Cardiac WDL WDL        Peripheral/Neurovascular WDL    Peripheral Neurovascular WDL WDL        Cognitive/Neuro/Behavioral WDL    Cognitive/Neuro/Behavioral WDL WDL                     "

## 2024-01-16 NOTE — LETTER
January 16, 2024        Viral Grove  [address]  [place] [address]          Dear [place]al Grove:    You were seen in the [facility] Emergency Department at [facility] EMERGENCY DEPARTMENT on 1/15/2024.  We are unable to reach you by phone, so we are sending you this letter.     It is important that you call [facility] Emergency Department lab result nurse at [phone], as we have information to relay to you AND/OR we MAY have to make some changes in your treatment.    Best time to call back is between 9AM and 5:30PM, 7 days a week.      Sincerely,     [facility] Emergency Department Lab Result RN  [phone]

## 2024-01-16 NOTE — CONSULTS
Orthopaedic Surgery Consultation    DATE OF SERVICE:  1/15/2024    This is a consultation requested by Dr. Menon for concern for septic joint.    CHIEF COMPLAINT:  Left elbow pain    HISTORY OBTAINED FROM: Patient and chart review    HISTORY:  This is a 73 year old old male with PMH DM2, HTN, and CAD who presents with his grandson who is assisting with interpretation with 1.5 days of left elbow pain. Reports insidious onset of pain that began approximately 1.5 days ago. The patient denies any known trauma or similar symptoms occurring in the past. He has swelling over the lateral aspect of the elbow. Patient was unable to bear weight or range the elbow after the onset of pain. Denies numbness, paresthesias, pain anywhere else in the body. Denies history or injury or prior surgery to the left elbow. Patient has no prior history of inflammatory arthritis, though he notes that his son has a Hx of gout. Patients denies fever, chills, nausea, vomiting, changes in bowel/bladder habits. Denies upper respiratory symptoms, urinary symptoms, rashes. He recently traveled to [place] to visit family for the holidays.    Location: Left elbow  Time of Onset: 1.5 days ago  Severity: 7/10  Quality: throbbing   Radiation: none   Exacerbating: movement   Alleviating: rest   Associated symptoms: none     PAST MEDICAL HISTORY:    Past Medical History:   Diagnosis Date    Diabetes (H)        PAST SURGICAL HISTORY:    No past surgical history on file.    FAMILY HISTORY:  Reviewed with patient and is non-contributory.   No personal or family history of bleeding or clotting disorders  No personal or family history of adverse reactions to anesthesia     SOCIAL HISTORY:   Tobacco - former   Alcohol - denies current use  Occupation - retired   Lives with daughter and grandson     ALLERGIES:   No Known Allergies    MEDS:   Blood Thinners: None  Prior to Admission medications    Medication Sig Last Dose Taking? Auth Provider Long Term End  Date   atorvastatin (LIPITOR) 80 MG tablet TAKE 1 TABLET EVERY DAY FOR CHOLESTEROL   Reported, Patient Yes    carvedilol (COREG) 6.25 MG tablet    Reported, Patient Yes    docusate sodium (COLACE) 100 MG capsule Take 1 capsule (100 mg) by mouth 2 times daily To prevent constipation   Bari Verdugo MD     JANUVIA 100 MG tablet    Reported, Patient Yes    levothyroxine (SYNTHROID/LEVOTHROID) 150 MCG tablet TAKE ONE TABLET BY MOUTH EVERY DAY FOR THYROID. DO NOT TAKE ON SUNDAYS.   Reported, Patient Yes    lisinopril (ZESTRIL) 10 MG tablet Take 10 mg by mouth daily   Reported, Patient Yes    metFORMIN (GLUCOPHAGE XR) 500 MG 24 hr tablet Take 1,000 mg by mouth   Reported, Patient Yes          REVIEW OF SYSTEMS:  An 11-point systems review was performed and negative except as noted in the HPI.      PHYSICAL EXAMINATION:    Vitals:    01/15/24 1851   BP: (!) 178/73   Pulse: 85   Resp: 18   Temp: 98.3  F (36.8  C)   TempSrc: Oral   SpO2: 98%         Gen:  Awake and Alert, pleasant, interactive, no acute distress.    Psych:  Articulates and Communicates with a normal affect    HEENT:  Normal and atraumatic    Pulm:   Non-labored breathing at rest. Saturating well on RA.   CV:  RRR   Pelvis:   Stable to anterior and lateral compression  Extremities:    RUE:   No deformity, skin intact.     Non-tender to palpation over clavicle, shoulder, arm, elbow, forearm, wrist.     Normal ROM shoulder, elbow ( extention/flexion, 90 deg supination, 90 deg pronation), wrist without pain. + FPL/EPL/intrinsics.     SILT over m/r/u distributions.     Radial pulse palpable.     LUE:  No deformity, skin intact. Mild to moderate effusion of the lateral elbow between the radial head, olecranon, and lateral epicondyle which is tender to palpation. The patient has limited ROM of the left elbow from  deg extention/flexion and 45 deg supination with full pronation.     Non-tender to palpation over clavicle, shoulder, arm, forearm,  wrist.     Normal ROM shoulder,  wrist without pain. + FPL/EPL/intrinsics.     SILT over m/r/u distributions.     Radial pulse palpable.     RLE:     No deformity, skin intact.     Non-tender to palpation over thigh, knee, leg, ankle/foot.     No pain with ROM hip/knee/ankle.     + TA/Gsc/EHL/FHL.     SILT DP/SP/sural/saph/tibial distributions.     DP/PT palpable, toes warm/well-perfused.     LLE:   No deformity, skin intact.     Non-tender to palpation over thigh, knee, leg, ankle/foot.     No pain with ROM hip/knee/ankle.     + TA/Gsc/EHL/FHL.     SILT DP/SP/sural/saph/tibial distributions.     DP/PT palpable, toes warm/well-perfused.     LABS/IMAGING:  Recent Labs   Lab Test 01/15/24  1919   HGB 10.9*   WBC 12.8*     Recent Labs   Lab Test 01/15/24  1919   CHLORIDE 103   CO2 26   BUN 23.5*     Recent Labs   Lab Test 01/15/24  1919   INR 0.98       Imaging:  XR's of the left elbow reveal no acute fracture or dislocation. The is chronic arthritic changes noted throughout the elbow joint with a number of osteophytes.     PROCEDURES:  PROCEDURE NOTE: Left elbow Aspiration  After informed verbal consent and identification of the appropriate anatomical site, the skin of the left elbow was properly sterilized with chloraprep solution. Using an left approach, an 18G needle was introduced into the joint space, yielding ~5cc of yellow synovial fluid. This specimen was properly labeled and promptly delivered to the laboratory.  The aspiration site was cleaned and covered with a sterile dressing.  Patient tolerated this without complication and remained stable in the immediate post-procedure period.       IMPRESSION AND PLAN:  A 73 year old old with concern for left elbow septic joint.  The patient has 1.5 days of insidious onset of atraumatic left elbow pain  with only chronic arthritic changes seen on XR, elevated inflammatory markers (WBC 12.8, .14, ESR 62), and decreased range of motion on exam, which was  concerning for an inflammatory arthritis or septic joint.  Joint was aspirated and fluid sent for the following labs:Cell count and differential, Aerobic/Anaerobic culture, Crystals. Will continue to monitor culture results. I will add a note with an updated plan once the aspiration labs have resulted. Given the patient is vitally stable and afebrile, would recommending antibiotics at this time.     - Plan for OR: Pending aspiration labs.   - Anticoagulation: Mechanical  - Antibiotics/tetanus: None at this time   - Xrays/imaging: No additional imaging   - Activity: activity as tolerated   - Weight Bearing: WBAT as tolerated   - Pain control: per emergency department   - Diet: NPO at midnight while aspiration labs are pending.   - Dispo: TBD  - Follow-up: TBD    On-call staff: Dr. Wayne.    Silvano Grimes MD  Orthopaedic Surgery, PGY2

## 2024-01-20 LAB
BACTERIA SNV CULT: NO GROWTH
GRAM STAIN RESULT: NORMAL
GRAM STAIN RESULT: NORMAL

## 2024-01-29 LAB — BACTERIA SNV CULT: NORMAL
